# Patient Record
Sex: FEMALE | Race: BLACK OR AFRICAN AMERICAN | Employment: UNEMPLOYED | URBAN - METROPOLITAN AREA
[De-identification: names, ages, dates, MRNs, and addresses within clinical notes are randomized per-mention and may not be internally consistent; named-entity substitution may affect disease eponyms.]

---

## 2020-04-28 ENCOUNTER — APPOINTMENT (OUTPATIENT)
Dept: GENERAL RADIOLOGY | Facility: CLINIC | Age: 30
End: 2020-04-28
Attending: EMERGENCY MEDICINE

## 2020-04-28 ENCOUNTER — HOSPITAL ENCOUNTER (EMERGENCY)
Facility: CLINIC | Age: 30
Discharge: HOME | End: 2020-04-28
Attending: EMERGENCY MEDICINE | Admitting: EMERGENCY MEDICINE

## 2020-04-28 VITALS
TEMPERATURE: 98 F | DIASTOLIC BLOOD PRESSURE: 81 MMHG | SYSTOLIC BLOOD PRESSURE: 128 MMHG | RESPIRATION RATE: 18 BRPM | HEART RATE: 90 BPM | OXYGEN SATURATION: 100 %
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
## 2020-04-28 DIAGNOSIS — B99.9 INFECTION: ICD-10-CM

## 2020-04-28 PROCEDURE — 25000125 ZZHC RX 250: Performed by: EMERGENCY MEDICINE

## 2020-04-28 PROCEDURE — 99283 EMERGENCY DEPT VISIT LOW MDM: CPT | Mod: 25 | Performed by: EMERGENCY MEDICINE

## 2020-04-28 PROCEDURE — 99284 EMERGENCY DEPT VISIT MOD MDM: CPT | Mod: 25 | Performed by: EMERGENCY MEDICINE

## 2020-04-28 PROCEDURE — 76705 ECHO EXAM OF ABDOMEN: CPT | Mod: 26 | Performed by: EMERGENCY MEDICINE

## 2020-04-28 PROCEDURE — 74019 RADEX ABDOMEN 2 VIEWS: CPT

## 2020-04-28 PROCEDURE — 90714 TD VACC NO PRESV 7 YRS+ IM: CPT | Performed by: EMERGENCY MEDICINE

## 2020-04-28 PROCEDURE — 90471 IMMUNIZATION ADMIN: CPT | Performed by: EMERGENCY MEDICINE

## 2020-04-28 PROCEDURE — 76705 ECHO EXAM OF ABDOMEN: CPT | Performed by: EMERGENCY MEDICINE

## 2020-04-28 PROCEDURE — 96372 THER/PROPH/DIAG INJ SC/IM: CPT | Performed by: EMERGENCY MEDICINE

## 2020-04-28 PROCEDURE — 25000128 H RX IP 250 OP 636: Performed by: EMERGENCY MEDICINE

## 2020-04-28 RX ORDER — IBUPROFEN 600 MG/1
600 TABLET, FILM COATED ORAL ONCE
Status: DISCONTINUED | OUTPATIENT
Start: 2020-04-28 | End: 2020-04-28

## 2020-04-28 RX ORDER — KETOROLAC TROMETHAMINE 30 MG/ML
30 INJECTION, SOLUTION INTRAMUSCULAR; INTRAVENOUS ONCE
Status: COMPLETED | OUTPATIENT
Start: 2020-04-28 | End: 2020-04-28

## 2020-04-28 RX ORDER — LIDOCAINE/PRILOCAINE 2.5 %-2.5%
1 CREAM (GRAM) TOPICAL
Status: COMPLETED | OUTPATIENT
Start: 2020-04-28 | End: 2020-04-28

## 2020-04-28 RX ORDER — BACITRACIN ZINC 500 [USP'U]/G
OINTMENT TOPICAL 2 TIMES DAILY
Qty: 14 G | Refills: 0 | Status: SHIPPED | OUTPATIENT
Start: 2020-04-28

## 2020-04-28 RX ADMIN — KETOROLAC TROMETHAMINE 30 MG: 30 INJECTION, SOLUTION INTRAMUSCULAR at 21:07

## 2020-04-28 RX ADMIN — LIDOCAINE AND PRILOCAINE 1 G: 25; 25 CREAM TOPICAL at 21:10

## 2020-04-28 RX ADMIN — CLOSTRIDIUM TETANI TOXOID ANTIGEN (FORMALDEHYDE INACTIVATED) AND CORYNEBACTERIUM DIPHTHERIAE TOXOID ANTIGEN (FORMALDEHYDE INACTIVATED) 0.5 ML: 5; 2 INJECTION, SUSPENSION INTRAMUSCULAR at 19:12

## 2020-04-28 NOTE — ED PROVIDER NOTES
"ED Provider Note  [facility]      History     Chief Complaint   Patient presents with     Dog Bite     HPI  Abril Beck is a 29 year old female who presents with pain at its site of a dog bite.  Patient states that she was bit by the family's pit bull in her abdomen 2 days ago.  Initially she did not think much of it, she put some \"African medicine\" on it which turned the skin black.  She now has increased pain so she came to be evaluated.  No fevers, nausea, vomiting.  She believes the dog is up-to-date on its vaccines and is healthy.  She reports it is a family dog and is able to monitor the dog's health.    She reports her last tetanus was in 2014.     Past Medical History  History reviewed. No pertinent past medical history.  History reviewed. No pertinent surgical history.  amoxicillin-clavulanate (AUGMENTIN) 875-125 MG tablet  bacitracin 500 UNIT/GM external ointment      No Known Allergies  Past medical history, past surgical history, medications, and allergies were reviewed with the patient. Additional pertinent items: None    Family History  No family history on file.  Family history was reviewed with the patient. Additional pertinent items: None    Social History  Social History     Tobacco Use     Smoking status: None   Substance Use Topics     Alcohol use: None     Drug use: None      Social history was reviewed with the patient. Additional pertinent items: None    Review of Systems  A complete review of systems was performed with pertinent positives and negatives noted in the HPI, and all other systems negative.    Physical Exam   BP: 133/79  Pulse: 110  Temp: 98.6  F (37  C)  Resp: 16  SpO2: 97 %  Physical Exam   General: awake, alert, NAD  Head: normal cephalic  HEENT: pupils equal, conjugate gaze in tact  Neck: Supple  CV: tachycardic rate  Lungs: breathing comfortably on room air  Abd: Patient has a skin defect below the bellybutton.  Some surrounding induration " but no fluctuance appreciated.  No drainage.  Skin over the site appears to be torn and discolored (unclear if this is just necrotic skin flap versus discolored by what the patient put on). Remainder of her abdomen is non-tender, no guarding, no peritoneal signs  Neuro: awake, answers questions appropriately. No focal deficits noted         ED Course      Procedures  Results for orders placed during the hospital encounter of 04/28/20   POC US SOFT TISSUE    Impression Limited Soft Tissue Ultrasound, performed and interpreted by me.    Indication:  Skin redness pain. Evaluate for cellulitis vs abscess.     Body location: abdomen    Findings:  There is no cobblestoning suggestive of cellulitis in the evaluated area. There is no fluid collection to suggest abscess. Area of shadowing, possible forgein body    IMPRESSION: No abscess.                 Results for orders placed or performed during the hospital encounter of 04/28/20   POC US SOFT TISSUE     Status: None    Impression    Limited Soft Tissue Ultrasound, performed and interpreted by me.    Indication:  Skin redness pain. Evaluate for cellulitis vs abscess.     Body location: abdomen    Findings:  There is no cobblestoning suggestive of cellulitis in the evaluated area. There is no fluid collection to suggest abscess. Area of shadowing, possible forgein body    IMPRESSION: No abscess.         KUB XR     Status: None    Narrative    ABDOMEN ONE VIEW 4/28/2020 7:52 PM     HISTORY: Rule out retained tooth.    COMPARISON: None available.      Impression    IMPRESSION: Single portable supine view of the abdomen and pelvis were  obtained. No evidence of radiodense foreign body. Nonobstructive bowel  gas pattern.    SHERYL MONTGOMERY MD     Medications   Td (tetanus & diphtheria toxoids) -  adult formulation - for ages 7 years and older (0.5 mLs Intramuscular Given 4/28/20 1912)   lidocaine-prilocaine (EMLA) cream 1 g (1 g Topical Given 4/28/20 2110)   ketorolac  (TORADOL) injection 30 mg (30 mg Intramuscular Given 4/28/20 2107)        Assessments & Plan (with Medical Decision Making)   On exam she is well-appearing, nontoxic, denies systemic complaints.  She has a deficit noted on her abdomen, with some surrounding induration but no obvious fluctuance.  Differential includes infection versus abscess versus retained tooth.    Bedside ultrasound did not show any obvious fluid-filled pockets.  There is some questionable shadowing so I did obtain an x-ray to rule out retained tooth, no evidence of foreign body on x-ray, the wound was irrigated and examined thoroughly by myself no evidence of foreign body noted on exam either.    Patient reports the dog has its rabies immunizations.  Did discuss observation for 10 days, should the dog exhibit abnormal behaviors or sudden death she should return for rabies treatment but no indication for rabies prophylaxis at this time.    Will discharge home with Augmentin and instructions to watch closely for signs of worsening symptoms such as fever, increased pain, purulent drainage.    I have reviewed the nursing notes. I have reviewed the findings, diagnosis, plan and need for follow up with the patient.    Discharge Medication List as of 4/28/2020  9:43 PM      START taking these medications    Details   amoxicillin-clavulanate (AUGMENTIN) 875-125 MG tablet Take 1 tablet by mouth 2 times daily, Disp-28 tablet,R-0, Local Print      bacitracin 500 UNIT/GM external ointment Apply topically 2 times dailyDisp-14 g,R-0Local Print             Final diagnoses:   Dog bite, initial encounter   Infection       --  Jose Cruz Mckinley MD   Emergency Medicine   [facility], EMERGENCY DEPARTMENT  4/28/2020     Jose Cruz Mckinley MD  04/28/20 6635

## 2020-04-29 NOTE — DISCHARGE INSTRUCTIONS
TODAY'S VISIT:  You were seen today for pain around a dog bite site.  Suspect early infection, please take Augmentin as prescribed.  Please watch your wound if you get increasing pain, fevers, or other symptoms please return to the ER.  Keep your wound covered with bacitracin.        FOLLOW-UP:  Please make an appointment to follow up with:  - [facility] (phone: [phone]) in 3-4 days if no improvement.     - Have your provider review the results from today's visit with you again to make sure no further follow-up or additional testing is needed based on those results.     PRESCRIPTIONS / MEDICATIONS:  -Augmentin 1 pill twice daily for 2 weeks    OTHER INSTRUCTIONS:  -Watch for fever, worsening signs of infection, increased pain, or drainage    RETURN TO THE EMERGENCY DEPARTMENT  Return to the Emergency Department immediately for any new or worsening symptoms or any concerns.     Remember that you can always come back to the Emergency Department if you are not able to see your regular doctor in the amount of time listed above, if you get any new symptoms, or if there is anything that worries you.

## 2020-05-12 ENCOUNTER — HOSPITAL ENCOUNTER (EMERGENCY)
Facility: CLINIC | Age: 30
Discharge: HOME | End: 2020-05-12
Attending: EMERGENCY MEDICINE | Admitting: EMERGENCY MEDICINE

## 2020-05-12 VITALS
HEART RATE: 88 BPM | OXYGEN SATURATION: 99 % | SYSTOLIC BLOOD PRESSURE: 118 MMHG | DIASTOLIC BLOOD PRESSURE: 78 MMHG | RESPIRATION RATE: 18 BRPM

## 2020-05-12 DIAGNOSIS — F10.920 ALCOHOLIC INTOXICATION WITHOUT COMPLICATION (H): ICD-10-CM

## 2020-05-12 LAB — ALCOHOL BREATH TEST: 0.07 (ref 0–0.01)

## 2020-05-12 PROCEDURE — 99285 EMERGENCY DEPT VISIT HI MDM: CPT

## 2020-05-12 PROCEDURE — 82075 ASSAY OF BREATH ETHANOL: CPT

## 2020-05-12 ASSESSMENT — ENCOUNTER SYMPTOMS: FEVER: 0

## 2020-05-12 NOTE — ED NOTES
Bed: ED06  Expected date: 5/12/20  Expected time: 6:09 PM  Means of arrival: Ambulance  Comments:  -ETOH

## 2020-05-12 NOTE — ED TRIAGE NOTES
A&O x4, ABCs intact. Pt presents with EMS for alcohol intoxication. EMS states that pt was found on [address] in her bra and underwear. Police were called and PBT was performed with a result of 0.247. Pt calm and cooperative.

## 2020-05-12 NOTE — ED PROVIDER NOTES
History     Chief Complaint:  Altered mental status    HPI   Abril Beck is a 29 year old female who presents with alcohol intoxication.  The patient was reportedly found walking [place] and [place] [address], about 30 minutes out the [facility].  The patient has been cooperative.  She states that it has been about 10 hours since she has last eaten anything and states that she has drank quite a bit today but is unable to say how much.  She denies that she drinks daily, but states that she drinks when she is stressed.  I asked her if she was stressed today and she said no. Her only complaint is that she is hungry.    Allergies:  No known drug allergies    Medications:    The patient is not currently taking any prescribed medications.    Past Medical History:    Patient denies    Past Surgical History:    Patient denies    Family History:    Patient denies    Social History:  Alcohol: The patient states that she drinks especially when she is stressed  The patient presents to the emergency department via EMS  PCP: No Ref-Primary, Physician  Marital Status:   [2]    Review of Systems   Constitutional: Negative for fever.   Neurological:        Intoxicated   All other systems reviewed and are negative.    Physical Exam     Patient Vitals for the past 24 hrs:   BP Pulse Heart Rate Resp SpO2   05/12/20 2351 118/78 88 -- 18 99 %   05/12/20 1837 115/70 93 93 -- 100 %     Physical Exam  Constitutional: Vital signs reviewed as above.   HEENT:    Head: No external signs of trauma noted.    Eyes: Conjunctivae are normal. Pupils are equal, round, and reactive to light.    Cardiovascular: Normal rate, regular rhythm and normal heart sounds.    Pulmonary/Chest: Effort normal and breath sounds normal. No respiratory distress. Patient has no wheezes.   Gastrointestinal: Soft, non-tender, non-distended.  Musculoskeletal: No gross deformities noted. Normal tone  Skin: Skin is warm and dry. No diaphoresis noted.    Neurological: Patient is alert. She appears intoxicated  Psychiatric: Good eye contact. Smiling.      Emergency Department Course   Emergency Department Course:  Past medical records, nursing notes, and vitals reviewed.  1828: I performed an exam of the patient and obtained history, as documented above.     2253: I rechecked the patient. She seems more sober. She has not been able to find a ride yet. Will get POC breathalizer.    2332: POCT Alcohol is 0.07. Patient also seems clinically sober.     Impression & Plan   Medical Decision Making:  This 29-year-old female patient presents the ED due to alcohol intoxication.  Please see the HPI and exam for specifics.  The patient has been cooperative.  Her alcohol level is now below the legal limit and she appears clinically sober.  She will be able to be discharged and should follow-up in the outpatient setting as needed.    Diagnosis:    ICD-10-CM    1. Alcoholic intoxication without complication (H)  F10.920        Disposition:  Discharged to home.    Discharge Medications:  Discharge Medication List as of 5/12/2020 11:38 PM        Kianna Hair  5/12/2020   [facility] EMERGENCY DEPARTMENT  Scribe Disclosure:  Kianna MENESES, am serving as a scribe at 6:32 PM on 5/12/2020 to document services personally performed by Evans Vivas DO based on my observations and the provider's statements to me.        Evans Vivas DO  05/13/20 0035

## 2020-05-12 NOTE — ED AVS SNAPSHOT
[facility] Emergency Department  [address]  [facility] [address]  Phone:  [phone]  Fax:  [phone]                                    Abril Beck   MRN: [id number]    Department:  [facility] Emergency Department   Date of Visit:  5/12/2020           After Visit Summary Signature Page    I have received my discharge instructions, and my questions have been answered. I have discussed any challenges I see with this plan with the nurse or doctor.    ..........................................................................................................................................  Patient/Patient Representative Signature      ..........................................................................................................................................  Patient Representative Print Name and Relationship to Patient    ..................................................               ................................................  Date                                   Time    ..........................................................................................................................................  Reviewed by Signature/Title    ...................................................              ..............................................  Date                                               Time          22EPIC Rev 08/18

## 2020-05-12 NOTE — ED NOTES
"Pt giving this RN a phone number for someone named Farzana to call and pick her up. However, pt does not have this person as a contact in her phone and has been dialing a random number on her phone. When asked who Farzana is pt states, \"some whore.\" RN will not contact this person. Pt unable to tell this RN of any other person that can pick her up.  "

## 2020-05-13 NOTE — DISCHARGE INSTRUCTIONS
Diagnosis: Alcohol intoxication  What do you do next: Please reduce your consumption of alcohol.  Please follow-up with your primary care clinic when you return home.  When do you return: If you have dizziness, fainting, seizures, hallucinations, or any other symptoms that concern you please return to the emergency department for reevaluation.    Thank you for allowing us to care for you today.